# Patient Record
Sex: FEMALE | Race: WHITE | NOT HISPANIC OR LATINO | URBAN - METROPOLITAN AREA
[De-identification: names, ages, dates, MRNs, and addresses within clinical notes are randomized per-mention and may not be internally consistent; named-entity substitution may affect disease eponyms.]

---

## 2022-04-28 ENCOUNTER — APPOINTMENT (RX ONLY)
Dept: URBAN - METROPOLITAN AREA CLINIC 168 | Facility: CLINIC | Age: 47
Setting detail: DERMATOLOGY
End: 2022-04-28

## 2022-04-28 DIAGNOSIS — L50.8 OTHER URTICARIA: ICD-10-CM | Status: INADEQUATELY CONTROLLED

## 2022-04-28 PROCEDURE — ? TREATMENT REGIMEN

## 2022-04-28 PROCEDURE — ? ADDITIONAL NOTES

## 2022-04-28 PROCEDURE — 99203 OFFICE O/P NEW LOW 30 MIN: CPT

## 2022-04-28 PROCEDURE — ? COUNSELING

## 2022-04-28 PROCEDURE — ? FULL BODY SKIN EXAM - DECLINED

## 2022-04-28 NOTE — PROCEDURE: ADDITIONAL NOTES
Additional Notes: Discussed with pt to take two Claritin’s in the morning and if needed can take Benadryl at night daily\\nRecommended to see an allergist specialist ([facility]) Additional Notes: Discussed with pt to take two Claritin’s in the morning and if needed can take Benadryl at night daily\\nRecommended to see an allergist specialist ([facility])

## 2022-04-28 NOTE — HPI: HIVES (URTICARIA)
How Severe Are Your Hives?: moderate
Please Select The Phrase That Best Describes Your Hives.: individual welts do not stay in the same place for more than 24 hours
Is This A New Presentation, Or A Follow-Up?: Hives
Additional History: Breaks out in hives on and off, takes Claritin and Benadryl to help but still comes back sometimes a little and sometimes more \\nLess then a month \\nHas gotten worse, welts have gotten bigger the past week\\nSporadically pop up usually not in the same area but are common in back and side legs

## 2022-04-28 NOTE — PROCEDURE: TREATMENT REGIMEN
Plan: Given patient described swelling of the lips, I recommended that she see an allergist specialist ([facility]) for further evaluation Plan: Given patient described swelling of the lips, I recommended that she see an allergist specialist ([facility]) for further evaluation

## 2024-12-30 ENCOUNTER — APPOINTMENT (OUTPATIENT)
Dept: URBAN - METROPOLITAN AREA CLINIC 141 | Facility: CLINIC | Age: 49
Setting detail: DERMATOLOGY
End: 2024-12-30

## 2024-12-30 DIAGNOSIS — L85.3 XEROSIS CUTIS: ICD-10-CM

## 2024-12-30 DIAGNOSIS — D49.2 NEOPLASM OF UNSPECIFIED BEHAVIOR OF BONE, SOFT TISSUE, AND SKIN: ICD-10-CM

## 2024-12-30 DIAGNOSIS — Z71.89 OTHER SPECIFIED COUNSELING: ICD-10-CM

## 2024-12-30 DIAGNOSIS — L21.8 OTHER SEBORRHEIC DERMATITIS: ICD-10-CM

## 2024-12-30 DIAGNOSIS — L81.4 OTHER MELANIN HYPERPIGMENTATION: ICD-10-CM

## 2024-12-30 DIAGNOSIS — D22 MELANOCYTIC NEVI: ICD-10-CM

## 2024-12-30 PROBLEM — D22.9 MELANOCYTIC NEVI, UNSPECIFIED: Status: ACTIVE | Noted: 2024-12-30

## 2024-12-30 PROCEDURE — 11102 TANGNTL BX SKIN SINGLE LES: CPT

## 2024-12-30 PROCEDURE — ? SUNSCREEN TREATMENT REGIMEN

## 2024-12-30 PROCEDURE — ? ADDITIONAL NOTES

## 2024-12-30 PROCEDURE — 99213 OFFICE O/P EST LOW 20 MIN: CPT | Mod: 25

## 2024-12-30 PROCEDURE — ? OTC TREATMENT REGIMEN

## 2024-12-30 PROCEDURE — ? PRESCRIPTION

## 2024-12-30 PROCEDURE — ? COUNSELING

## 2024-12-30 PROCEDURE — ? BIOPSY BY SHAVE METHOD

## 2024-12-30 RX ORDER — MOMETASONE FUROATE 1 MG/ML
SOLUTION TOPICAL
Qty: 60 | Refills: 3 | Status: ERX | COMMUNITY
Start: 2024-12-30

## 2024-12-30 RX ADMIN — MOMETASONE FUROATE: 1 SOLUTION TOPICAL at 00:00

## 2024-12-30 ASSESSMENT — LOCATION ZONE DERM
LOCATION ZONE: TRUNK
LOCATION ZONE: ARM
LOCATION ZONE: FACE
LOCATION ZONE: LEG

## 2024-12-30 ASSESSMENT — LOCATION SIMPLE DESCRIPTION DERM
LOCATION SIMPLE: RIGHT FOREARM
LOCATION SIMPLE: ABDOMEN
LOCATION SIMPLE: LEFT CHEEK
LOCATION SIMPLE: RIGHT CALF
LOCATION SIMPLE: LEFT CALF
LOCATION SIMPLE: RIGHT ANKLE
LOCATION SIMPLE: LEFT ANKLE
LOCATION SIMPLE: LEFT PRETIBIAL REGION
LOCATION SIMPLE: RIGHT CHEEK
LOCATION SIMPLE: UPPER BACK
LOCATION SIMPLE: LEFT FOREARM
LOCATION SIMPLE: RIGHT PRETIBIAL REGION
LOCATION SIMPLE: CHEST

## 2024-12-30 ASSESSMENT — LOCATION DETAILED DESCRIPTION DERM
LOCATION DETAILED: LEFT LATERAL ABDOMEN
LOCATION DETAILED: RIGHT PROXIMAL DORSAL FOREARM
LOCATION DETAILED: RIGHT INFERIOR CENTRAL MALAR CHEEK
LOCATION DETAILED: RIGHT DISTAL CALF
LOCATION DETAILED: SUPERIOR THORACIC SPINE
LOCATION DETAILED: LEFT DISTAL PRETIBIAL REGION
LOCATION DETAILED: LEFT POSTERIOR ANKLE
LOCATION DETAILED: LEFT DISTAL CALF
LOCATION DETAILED: RIGHT DISTAL PRETIBIAL REGION
LOCATION DETAILED: UPPER STERNUM
LOCATION DETAILED: LEFT PROXIMAL DORSAL FOREARM
LOCATION DETAILED: LEFT INFERIOR CENTRAL MALAR CHEEK
LOCATION DETAILED: RIGHT POSTERIOR ANKLE

## 2024-12-30 NOTE — PROCEDURE: BIOPSY BY SHAVE METHOD
Detail Level: Detailed
Depth Of Biopsy: dermis
Was A Bandage Applied: Yes
Size Of Lesion In Cm: 0
Biopsy Type: H and E
Biopsy Method: double edge Personna blade
Anesthesia Type: 1% lidocaine with epinephrine
Anesthesia Volume In Cc: 0.5
Hemostasis: Drysol
Wound Care: Petrolatum
Dressing: bandage
Destruction After The Procedure: No
Type Of Destruction Used: Curettage
Curettage Text: The wound bed was treated with curettage after the biopsy was performed.
Cryotherapy Text: The wound bed was treated with cryotherapy after the biopsy was performed.
Electrodesiccation Text: The wound bed was treated with electrodesiccation after the biopsy was performed.
Electrodesiccation And Curettage Text: The wound bed was treated with electrodesiccation and curettage after the biopsy was performed.
Silver Nitrate Text: The wound bed was treated with silver nitrate after the biopsy was performed.
Lab: [id number]
Medical Necessity Information: It is in your best interest to select a reason for this procedure from the list below. All of these items fulfill various CMS LCD requirements except the new and changing color options.
Consent: Written consent was obtained and risks were reviewed including but not limited to scarring, infection, bleeding, scabbing, incomplete removal, nerve damage and allergy to anesthesia.
Post-Care Instructions: I reviewed with the patient in detail post-care instructions. Patient is to keep the biopsy site dry overnight, and then apply bacitracin twice daily until healed. Patient may apply hydrogen peroxide soaks to remove any crusting.
Notification Instructions: Patient will be notified of biopsy results. However, patient instructed to call the office if not contacted within 2 weeks.
Billing Type: Third-Party Bill
Information: Selecting Yes will display possible errors in your note based on the variables you have selected. This validation is only offered as a suggestion for you. PLEASE NOTE THAT THE VALIDATION TEXT WILL BE REMOVED WHEN YOU FINALIZE YOUR NOTE. IF YOU WANT TO FAX A PRELIMINARY NOTE YOU WILL NEED TO TOGGLE THIS TO 'NO' IF YOU DO NOT WANT IT IN YOUR FAXED NOTE.

## 2024-12-30 NOTE — PROCEDURE: ADDITIONAL NOTES
Detail Level: Zone
Additional Notes: Patient requests removal because it gets irritated by her clothing
Render Risk Assessment In Note?: no